# Patient Record
Sex: FEMALE | Race: BLACK OR AFRICAN AMERICAN | NOT HISPANIC OR LATINO | Employment: FULL TIME | URBAN - METROPOLITAN AREA
[De-identification: names, ages, dates, MRNs, and addresses within clinical notes are randomized per-mention and may not be internally consistent; named-entity substitution may affect disease eponyms.]

---

## 2018-01-14 NOTE — PROGRESS NOTES
Assessment    1  Encounter for preventive health examination (V70 0) (Z00 00)   2  Flu vaccine need (V04 81) (Z23)    Plan  Flu vaccine need    · Stop: Fluzone Quadrivalent 0 5 ML Intramuscular Suspension    Discussion/Summary  health maintenance visit Currently, she eats an adequate diet and has an adequate exercise regimen  cervical cancer screening is current cervical cancer screening is managed by [facility] Breast cancer screening: breast cancer screening is not indicated  Colorectal cancer screening: colorectal cancer screening is not indicated  Osteoporosis screening: bone mineral density testing is not indicated  The patient declines immunizations  Advice and education were given regarding nutrition and weight loss  Form completed  The patient was counseled regarding impressions  Chief Complaint  wellness, physical for work      History of Present Illness  HM, Adult Female: The patient is being seen for a health maintenance evaluation  The last health maintenance visit was 1 year(s) ago  Social History: Household members include parents  Work status: working full time and occupation:  in [place]  (parents moving to [address] [place] [address] in the next year but she will be either staying here or going to [place])  General Health: The patient's health since the last visit is described as good  She has regular dental visits  She complains of vision problems  Vision care includes wearing soft contact lenses and an eye examination more than a year ago  She denies hearing loss  Immunizations status: not up to date The patient needs the following immunization(s): influenza vaccine  Lifestyle:  She consumes a diverse and healthy diet  She has weight concerns  (gained weight after a recent cruise)   She exercises regularly  She exercises 3 or more times per week  She does not use tobacco  She denies alcohol use  Reproductive health: the patient is premenopausal   she reports normal menses  she uses contraception   For contraception, she uses oral contraception pills  Screening: Cervical cancer screening includes a pap smear performed last year  Breast cancer screening includes no previous mammogram  She hasn't been previously screened for colorectal cancer  Cardiovascular risk factors: no hypertension and no diabetes  Review of Systems    Constitutional: recent weight gain, but no fever, not feeling poorly, no chills and not feeling tired  ENT: mild seasonal allergies, but no sore throat  Cardiovascular: no chest pain and no palpitations  Respiratory: no shortness of breath and no cough  Gastrointestinal: no abdominal pain, no constipation and no diarrhea  Genitourinary: no dysuria  Musculoskeletal: no arthralgias and no myalgias  Active Problems    1  Birth control (V25 9) (Z30 9)   2  Eczema (692 9) (L30 9)    Past Medical History    · History of gastritis (V12 79) (Z87 19)    Surgical History    · Denied: History Of Prior Surgery    Family History  Maternal Grandmother    · Family history of cerebrovascular accident (V17 1) (Z82 3)   · Family history of diabetes mellitus (V18 0) (Z83 3)  Maternal Grandfather    · Family history of cerebrovascular accident (V17 1) (Z82 3)   · Family history of diabetes mellitus (V18 0) (Z83 3)    Social History    · Denied: Alcohol Use (History)   · Exercising Regularly   · Never A Smoker    Current Meds   1  Cetaphil External Cream; Apply to affected area 2-3 times daily as needed; Therapy: [id number] to (Last Rx:[id number]) Ordered   2  Hydrocortisone 1 % External Ointment; Apply sparingly twice daily; Therapy: 93GLJ4551 to (Last Rx:[id number]) Ordered   3  Junel FE 1/20 1-20 MG-MCG Oral Tablet; TAKE 1 TABLET DAILY FOR 21 DAYS, THEN 7   TABLET-FREE DAYS; REPEAT; Therapy: (Recorded:00Gsh6420) to Recorded    Allergies    1  No Known Drug Allergies    2   Seasonal    Vitals   Recorded: 20YLY1884 04:06PM Recorded: 39LGM1920 16:91ZV   Systolic 565    Diastolic 80 Heart Rate 80    Respiration 14    Height 5 ft 6 5 in    Weight  162 lb      Physical Exam    Constitutional   General appearance: No acute distress, well appearing and well nourished  Head and Face   Head and face: Normal     Eyes   Conjunctiva and lids: No swelling, erythema or discharge  Ears, Nose, Mouth, and Throat   Otoscopic examination: Tympanic membranes translucent with normal light reflex  Canals patent without erythema  Oropharynx: Normal with no erythema, edema, exudate or lesions  Neck   Neck: Supple, symmetric, trachea midline, no masses  Thyroid: Normal, no thyromegaly  Pulmonary   Respiratory effort: No increased work of breathing or signs of respiratory distress  Auscultation of lungs: Clear to auscultation  Cardiovascular   Auscultation of heart: Normal rate and rhythm, normal S1 and S2, no murmurs  Abdomen   Abdomen: Non-tender, no masses  Liver and spleen: No hepatomegaly or splenomegaly  Lymphatic   Palpation of lymph nodes in neck: No lymphadenopathy      Musculoskeletal   Gait and station: Normal     Psychiatric   Orientation to person, place, and time: Normal     Mood and affect: Normal        Signatures   Electronically signed by : ZEKE Cobb ; Oct 31 2016  4:09PM EST                       (Author)

## 2018-05-22 ENCOUNTER — OFFICE VISIT (OUTPATIENT)
Dept: INTERNAL MEDICINE CLINIC | Facility: CLINIC | Age: 28
End: 2018-05-22
Payer: COMMERCIAL

## 2018-05-22 VITALS
WEIGHT: 166 LBS | SYSTOLIC BLOOD PRESSURE: 106 MMHG | OXYGEN SATURATION: 99 % | HEART RATE: 64 BPM | TEMPERATURE: 98.1 F | BODY MASS INDEX: 27.66 KG/M2 | HEIGHT: 65 IN | DIASTOLIC BLOOD PRESSURE: 78 MMHG

## 2018-05-22 DIAGNOSIS — R20.2 NUMBNESS AND TINGLING IN LEFT ARM: ICD-10-CM

## 2018-05-22 DIAGNOSIS — R20.0 NUMBNESS AND TINGLING IN LEFT ARM: ICD-10-CM

## 2018-05-22 DIAGNOSIS — M79.602 LEFT ARM PAIN: Primary | ICD-10-CM

## 2018-05-22 PROCEDURE — 1036F TOBACCO NON-USER: CPT | Performed by: NURSE PRACTITIONER

## 2018-05-22 PROCEDURE — 3008F BODY MASS INDEX DOCD: CPT | Performed by: NURSE PRACTITIONER

## 2018-05-22 PROCEDURE — 99213 OFFICE O/P EST LOW 20 MIN: CPT | Performed by: NURSE PRACTITIONER

## 2018-05-22 RX ORDER — GLY/DIMETH/PETROLAT,WHT/WATER
CREAM (GRAM) TOPICAL
COMMUNITY
Start: 2015-02-17

## 2018-05-22 RX ORDER — NORETHINDRONE ACETATE AND ETHINYL ESTRADIOL 1MG-20(21)
1 KIT ORAL
COMMUNITY

## 2018-05-22 RX ORDER — IBUPROFEN 800 MG/1
800 TABLET ORAL 3 TIMES DAILY PRN
Refills: 0 | COMMUNITY
Start: 2018-02-18

## 2018-05-22 RX ORDER — AZITHROMYCIN 250 MG/1
TABLET, FILM COATED ORAL
Refills: 0 | COMMUNITY
Start: 2018-02-18 | End: 2018-11-06

## 2018-05-22 RX ORDER — DIAPER,BRIEF,INFANT-TODD,DISP
EACH MISCELLANEOUS 2 TIMES DAILY
COMMUNITY
Start: 2015-02-17

## 2018-05-22 RX ORDER — BROMPHENIRAMINE MALEATE, PSEUDOEPHEDRINE HYDROCHLORIDE, AND DEXTROMETHORPHAN HYDROBROMIDE 2; 30; 10 MG/5ML; MG/5ML; MG/5ML
SYRUP ORAL
Refills: 0 | COMMUNITY
Start: 2018-02-18

## 2018-05-22 NOTE — PROGRESS NOTES
Assessment/Plan:     Diagnoses and all orders for this visit:    Left arm pain  -     EMG 1 Limb; Future    Numbness and tingling in left arm  -     EMG 1 Limb; Future    Other orders  -     azithromycin (ZITHROMAX) 250 mg tablet; TAKE AS DIRECTED ON PACKAGE LABELLING  -     Emollient (CETAPHIL) cream; Apply topically  -     hydrocortisone 1 % cream; Apply topically 2 (two) times a day  -     ibuprofen (MOTRIN) 800 mg tablet; Take 800 mg by mouth 3 (three) times a day as needed  -     norethindrone-ethinyl estradiol (JUNEL FE 1/20) 1-20 MG-MCG per tablet; Take 1 tablet by mouth  -     brompheniramine-pseudoephedrine-DM 30-2-10 MG/5ML syrup; TAKE 1-2 TEASPOONSFUL (5-10ML) BY MOUTH 3 TIMES A DAY AS NEEDED FOR COUGH AND/OR CONGESTION          Subjective:      Patient ID: Juancarlos Turner is a 24 y o  female  Here for left arm pain   Started about a week ago  Comes and goes   Raising her arm up produces the pain   Burning sensation, pins and needles  Radiates down her arm and makes her arm feel heavy/tingling  Lasts a few seconds to minutes   Worse at the end of the day    Denies any injury, fall, trauma, or skin rash  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          The following portions of the patient's history were reviewed and updated as appropriate: allergies, current medications, past family history, past medical history, past social history, past surgical history and problem list     Review of Systems   Constitutional: Negative  Respiratory: Negative  Cardiovascular: Negative  Musculoskeletal:        Left arm pain   Neurological: Positive for numbness  Objective:      /78   Pulse 64   Temp 98 1 °F (36 7 °C)   Ht 5' 5" (1 651 m)   Wt 75 3 kg (166 lb)   SpO2 99%   BMI 27 62 kg/m²          Physical Exam   Constitutional: She is oriented to person, place, and time  She appears well-developed and well-nourished     Cardiovascular: Normal rate, regular rhythm and normal heart sounds  Pulmonary/Chest: Effort normal and breath sounds normal    Musculoskeletal: Normal range of motion  She exhibits no edema, tenderness or deformity  Left shoulder: She exhibits normal range of motion, no tenderness, no swelling, no pain and normal strength  No pain with extension, abduction, or adduction  Mild discomfort with numbness radiating down her arm with flexion with arm overhead  Neurological: She is alert and oriented to person, place, and time  Skin: No rash noted  Psychiatric: She has a normal mood and affect  Her behavior is normal    Vitals reviewed

## 2018-11-06 ENCOUNTER — OFFICE VISIT (OUTPATIENT)
Dept: INTERNAL MEDICINE CLINIC | Facility: CLINIC | Age: 28
End: 2018-11-06
Payer: COMMERCIAL

## 2018-11-06 VITALS
BODY MASS INDEX: 27.16 KG/M2 | OXYGEN SATURATION: 95 % | HEART RATE: 98 BPM | DIASTOLIC BLOOD PRESSURE: 74 MMHG | HEIGHT: 65 IN | WEIGHT: 163 LBS | SYSTOLIC BLOOD PRESSURE: 122 MMHG

## 2018-11-06 DIAGNOSIS — J20.9 ACUTE BRONCHITIS, UNSPECIFIED ORGANISM: Primary | ICD-10-CM

## 2018-11-06 DIAGNOSIS — J98.01 COUGH DUE TO BRONCHOSPASM: ICD-10-CM

## 2018-11-06 PROCEDURE — 3008F BODY MASS INDEX DOCD: CPT | Performed by: NURSE PRACTITIONER

## 2018-11-06 PROCEDURE — 99213 OFFICE O/P EST LOW 20 MIN: CPT | Performed by: NURSE PRACTITIONER

## 2018-11-06 RX ORDER — AZITHROMYCIN 250 MG/1
TABLET, FILM COATED ORAL
Qty: 6 TABLET | Refills: 0 | Status: SHIPPED | OUTPATIENT
Start: 2018-11-06 | End: 2018-11-11

## 2018-11-06 RX ORDER — BENZONATATE 100 MG/1
100 CAPSULE ORAL 3 TIMES DAILY PRN
Qty: 20 CAPSULE | Refills: 0 | Status: SHIPPED | OUTPATIENT
Start: 2018-11-06

## 2018-11-06 RX ORDER — ALBUTEROL SULFATE 90 UG/1
2 AEROSOL, METERED RESPIRATORY (INHALATION) EVERY 6 HOURS PRN
Qty: 18 G | Refills: 0 | Status: SHIPPED | OUTPATIENT
Start: 2018-11-06

## 2018-11-06 NOTE — PROGRESS NOTES
Assessment/Plan:     Diagnoses and all orders for this visit:    Acute bronchitis, unspecified organism  -     azithromycin (ZITHROMAX) 250 mg tablet; Take 2 tablets daily on day 1 then 1 tablet daily for the next 4 days    Cough due to bronchospasm  -     benzonatate (TESSALON PERLES) 100 mg capsule; Take 1 capsule (100 mg total) by mouth 3 (three) times a day as needed for cough  -     albuterol (VENTOLIN HFA) 90 mcg/act inhaler; Inhale 2 puffs every 6 (six) hours as needed for wheezing or shortness of breath    Other orders  -     Cancel: SYRINGE/SINGLE-DOSE VIAL: influenza vaccine, [id number], quadrivalent, 0 5 mL, preservative-free, for patients 3+ yr (FLUZONE)          Subjective:      Patient ID: Dionisio Schwarz is a 34 y o  female  Here for cough and sob  Started 1 week ago   +sore throat, productive cough with thick sputum, laryngitis   When she gets in a coughing fit, its hard to take a deep breath in   Denies fever, nasal congestion, myalgias   Tried mucinex OTC           The following portions of the patient's history were reviewed and updated as appropriate: allergies, current medications, past family history, past medical history, past social history, past surgical history and problem list     Review of Systems   Constitutional: Negative  HENT: Positive for congestion, sore throat and voice change  Negative for rhinorrhea  Respiratory: Positive for choking, chest tightness and shortness of breath  Negative for wheezing  Cardiovascular: Negative  Gastrointestinal: Negative  Musculoskeletal: Negative for myalgias  Skin: Negative  Neurological: Negative  Objective:      /74   Pulse 98   Ht 5' 5" (1 651 m)   Wt 73 9 kg (163 lb)   SpO2 95%   BMI 27 12 kg/m²          Physical Exam   Constitutional: She is oriented to person, place, and time  She appears well-developed and well-nourished     HENT:   Right Ear: External ear normal    Left Ear: External ear normal  Mouth/Throat: Posterior oropharyngeal erythema present  No oropharyngeal exudate or posterior oropharyngeal edema  Eyes: Pupils are equal, round, and reactive to light  Cardiovascular: Normal rate, regular rhythm and normal heart sounds  Pulmonary/Chest: Effort normal  She has decreased breath sounds in the right lower field  Lymphadenopathy:     She has no cervical adenopathy  Neurological: She is alert and oriented to person, place, and time  Psychiatric: She has a normal mood and affect  Her behavior is normal    Vitals reviewed

## 2019-10-02 ENCOUNTER — TELEPHONE (OUTPATIENT)
Dept: INTERNAL MEDICINE CLINIC | Facility: CLINIC | Age: 29
End: 2019-10-02

## 2019-11-26 ENCOUNTER — TELEPHONE (OUTPATIENT)
Dept: INTERNAL MEDICINE CLINIC | Facility: CLINIC | Age: 29
End: 2019-11-26

## 2019-11-26 NOTE — TELEPHONE ENCOUNTER
Left message for patient to call back     Is patient still seeing Belinda Blanco as pcp?  If so please schedule physical